# Patient Record
Sex: MALE | Race: WHITE
[De-identification: names, ages, dates, MRNs, and addresses within clinical notes are randomized per-mention and may not be internally consistent; named-entity substitution may affect disease eponyms.]

---

## 2018-07-09 ENCOUNTER — HOSPITAL ENCOUNTER (INPATIENT)
Dept: HOSPITAL 36 - ER | Age: 58
LOS: 9 days | Discharge: NURSING, REHAB OR LONG-TERM CARE | DRG: 885 | End: 2018-07-18
Attending: PSYCHIATRY & NEUROLOGY | Admitting: PSYCHIATRY & NEUROLOGY
Payer: MEDICARE

## 2018-07-09 DIAGNOSIS — K59.00: ICD-10-CM

## 2018-07-09 DIAGNOSIS — D64.9: ICD-10-CM

## 2018-07-09 DIAGNOSIS — F29: ICD-10-CM

## 2018-07-09 DIAGNOSIS — F25.9: Primary | ICD-10-CM

## 2018-07-09 DIAGNOSIS — E87.1: ICD-10-CM

## 2018-07-09 NOTE — ED PHYSICIAN CHART
ED Chief Complaint/HPI





- Patient Information


Date Seen:: 07/09/18


Time Seen:: 22:00


Chief Complaint:: psychosis


History of Present Illness:: 





58 yr old male from nursing home for geropsych evualuation


Allergies:: 


 Allergies











Allergy/AdvReac Type Severity Reaction Status Date / Time


 


No Known Allergies Allergy   Verified 07/09/18 23:15











Vitals:: 


 Vital Signs - 8 hr











  07/09/18





  23:15


 


Temp 98.1 F


 


HR 58


 


RR 18


 


/60


 


O2 Sat % 98











Historian:: Medical Records





ED Review of Systems





- Review of Systems


General/Constitutional: No fever, No chills, No weight loss, No weakness, No 

diaphoresis, No edema, No loss of appetite


Skin: No skin lesions, No rash, No bruising


Head: No headache, No light-headedness


Eyes: No loss of vision, No pain, No diplopia


ENT: No earache, No nasal drainage, No sore throat, No tinnitus


Neck: No neck pain, No swelling, No thyromegaly, No stiffness, No mass noted


Cardio Vascular: No chest pain, No palpitations, No PND, No orthopnea, No edema


Pulmonary: No SOB, No cough, No sputum, No wheezing


GI: No nausea, No vomiting, No diarrhea, No pain, No melena, No hematochezia, 

No constipation, No hematemesis


G/U: No dysuria, No frequency, No hematuria


Musculoskeletal: No bone or joint pain, No back pain, No muscle pain


Endocrine: No polyuria, No polydipsia


Psychiatric: No prior psych history, No depression, No anxiety, No suicidal 

ideation


Hematopoietic: No bruising, No lymphadenopathy


Allergic/Immuno: No urticaria, No angioedema


Neurological: No syncope, No focal symptoms, No weakness, No paresthesia, No 

headache, No seizure, No dizziness, No confusion, No vertigo





ED Past Medical History





- Past Medical History


Past Medical History: Other (psych disorder)





Family Medical History





- Family Member


  ** Mother


History Unknown: Yes





ED Physical Exam





- Physical Examination


General/Constitutional: Awake, Well-developed, well-nourished, Alert, No 

distress, GCS 15, Non-toxic appearing, Ambulatory


Head: Atraumatic


Eyes: Lids, conjuctiva normal, PERRL, EOMI


Skin: Nl inspection, No rash, No skin lesions, No ecchymosis, Well hydrated, No 

lymphadenopathy


ENMT: External ears, nose nl, Nasal exam nl, Lips, teeth, gums nl


Neck: Nontender, Full ROM w/o pain, No JVD, No nuchal rigidity, No bruit, No 

mass, No stridor


Respiratory: Nl effort/Exclusion, Clear to Auscultation, No Wheeze/Rhonchi/Rales


Cardio Vascular: RRR, No murmur, gallop, rubs, NL S1 S2


GI: No tenderness/rebounding/guarding, No organomegaly, No hernia, Normal BS's, 

Nondistended, No mass/bruits, No McBurney tenderness


: No CVA tenderness


Extremities: No tenderness or effusion, Full ROM, normal strength in all 

extremities, No edema, Normal digits & nails


Neuro/Psych: Alert/oriented, DTR's symmetric, Normal sensory exam, Normal motor 

strength, Judgement/insight normal, Mood normal, Normal gait, No focal deficits


Misc: Normal back, No paraspinal tenderness





ED Assessment





- Assessment


General Assessment: 





psychosis for eval





ED Septic Shock





- .


Is Septic Shock (SBP<90, OR Lactate>4 mmol\L) present?: No





- <6hrs of presentation:


Vital Signs: 


 Vital Signs - 8 hr











  07/09/18





  23:15


 


Temp 98.1 F


 


HR 58


 


RR 18


 


/60


 


O2 Sat % 98














ED Reassessment (Disposition)





- Reassessment


Reassessment Condition:: Unchanged





- Diagnosis


Diagnosis:: 





psychosis





- Patient Disposition


Discharge/Transfer:: Acute Care w/in this hosp

## 2018-07-10 VITALS — SYSTOLIC BLOOD PRESSURE: 95 MMHG | DIASTOLIC BLOOD PRESSURE: 51 MMHG

## 2018-07-10 LAB
ALBUMIN SERPL-MCNC: 3.8 GM/DL (ref 4.2–5.5)
ALBUMIN/GLOB SERPL: 1.6 {RATIO} (ref 1–1.8)
ALP SERPL-CCNC: 72 U/L (ref 34–104)
ALT SERPL-CCNC: 11 U/L (ref 7–52)
ANION GAP SERPL CALC-SCNC: 8.9 MMOL/L (ref 7–16)
APPEARANCE UR: CLEAR
AST SERPL-CCNC: 14 U/L (ref 13–39)
BACTERIA #/AREA URNS HPF: (no result) /HPF
BASOPHILS # BLD AUTO: 0.1 TH/CUMM (ref 0–0.2)
BASOPHILS NFR BLD AUTO: 1 % (ref 0–2)
BILIRUB SERPL-MCNC: 0.2 MG/DL (ref 0.3–1)
BILIRUB UR-MCNC: NEGATIVE MG/DL
BUN SERPL-MCNC: 11 MG/DL (ref 7–25)
CALCIUM SERPL-MCNC: 9.2 MG/DL (ref 8.6–10.3)
CHLORIDE SERPL-SCNC: 100 MEQ/L (ref 98–107)
CO2 SERPL-SCNC: 26.6 MEQ/L (ref 21–31)
COLOR UR: (no result)
CREAT SERPL-MCNC: 0.8 MG/DL (ref 0.7–1.3)
EOSINOPHIL # BLD AUTO: 0.1 TH/CMM (ref 0.1–0.4)
EOSINOPHIL NFR BLD AUTO: 1.9 % (ref 0–5)
EPI CELLS URNS QL MICRO: (no result) /LPF
ERYTHROCYTE [DISTWIDTH] IN BLOOD BY AUTOMATED COUNT: 14.7 % (ref 11.5–20)
GLOBULIN SER-MCNC: 2.4 GM/DL
GLUCOSE SERPL-MCNC: 94 MG/DL (ref 70–105)
GLUCOSE UR STRIP-MCNC: NEGATIVE MG/DL
HCT VFR BLD CALC: 34 % (ref 41–60)
HGB BLD-MCNC: 11.4 GM/DL (ref 12–16)
KETONES UR STRIP-MCNC: NEGATIVE MG/DL
LEUKOCYTE ESTERASE UR-ACNC: NEGATIVE
LYMPHOCYTE AB SER FC-ACNC: 2.8 TH/CMM (ref 1.5–3)
LYMPHOCYTES NFR BLD AUTO: 42.9 % (ref 20–50)
MCH RBC QN AUTO: 29.9 PG (ref 26–30)
MCHC RBC AUTO-ENTMCNC: 33.5 PG (ref 28–36)
MCV RBC AUTO: 89.4 FL (ref 80–99)
MICRO URNS: YES
MONOCYTES # BLD AUTO: 0.7 TH/CMM (ref 0.3–1)
MONOCYTES NFR BLD AUTO: 10.8 % (ref 2–10)
NEUTROPHILS # BLD: 2.8 TH/CMM (ref 1.8–8)
NEUTROPHILS NFR BLD AUTO: 43.4 % (ref 40–80)
NITRITE UR QL STRIP: NEGATIVE
PH UR STRIP: 7 [PH] (ref 4.6–8)
PLATELET # BLD: 371 TH/CMM (ref 150–400)
PMV BLD AUTO: 6 FL
POTASSIUM SERPL-SCNC: 3.5 MEQ/L (ref 3.5–5.1)
PROT UR STRIP-MCNC: NEGATIVE MG/DL
RBC # BLD AUTO: 3.8 MIL/CMM (ref 4.3–5.7)
RBC # UR STRIP: (no result) /UL
RBC #/AREA URNS HPF: (no result) /HPF (ref 0–5)
SODIUM SERPL-SCNC: 132 MEQ/L (ref 136–145)
SP GR UR STRIP: <= 1.005 (ref 1–1.03)
URINALYSIS COMPLETE PNL UR: (no result)
UROBILINOGEN UR STRIP-ACNC: 0.2 E.U./DL (ref 0.2–1)
WBC # BLD AUTO: 6.5 TH/CMM (ref 4.8–10.8)
WBC #/AREA URNS HPF: (no result) /HPF (ref 0–5)

## 2018-07-10 NOTE — HISTORY & PHYSICAL
ADMIT DATE:  07/10/2018



HISTORY OF PRESENT ILLNESS:  The patient is a 58-year-old male with long history

of psychosis, admitted to [facility] under Dr. Greer's service for evaluation

and treatment.  The patient denies any chest pain, shortness of breath, nausea,

vomiting, fever or chills.



PAST MEDICAL HISTORY:  Significant for psychosis.



PAST SURGICAL HISTORY:  No recent surgery.



ALLERGIES:  None.



MEDICATIONS:  Follow admission reconciliation.



SOCIAL HISTORY:  No smoking, alcohol or drugs.



FAMILY HISTORY:  Noncontributory.



REVIEW OF SYSTEMS:

RENAL SYSTEM:  No history of chronic renal disorder.

CARDIOVASCULAR SYSTEM:  No coronary artery disease.

ENDOCRINE SYSTEM:  No diabetes or thyroid problem.

GASTROINTESTINAL SYSTEM:  No upper or lower gastrointestinal bleed.

NEUROLOGICAL SYSTEM:  No seizure disorder.

MUSCULOSKELETAL SYSTEM:  No muscular dystrophy.

HEMATOLOGICAL SYSTEM:  No bleeding tendency.

RESPIRATORY SYSTEM:  No asthma.

GENITOURINARY:  No hematuria.



PHYSICAL EXAMINATION:

GENERAL:  He is awake, alert, mildly confused.

VITAL SIGNS:  Temperature 98.1, heart rate 67, blood pressure 129/73.

HEENT:  Normocephalic.  Pupils reacting equal to light and accommodation. 

Sclerae clear.

NECK:  Supple.  Negative for lymphadenopathy, JVD or bruit.

CHEST:  Bilaterally normal.  No rhonchi or wheezing.

HEART:  S1, S2 normal.  No murmur or gallop rhythm.

ABDOMEN:  Soft, bowel sounds positive.

EXTREMITIES:  No edema.

NEUROLOGIC:  He is awake, alert, mildly confused.



LABORATORY DATA:  White blood cells 6.5, hemoglobin 11.4, hematocrit 34.0,

platelet 371.  Sodium 132, potassium 3.5, BUN 11, creatinine 0.8.



ASSESSMENT:

1.  Anemia.

2.  Hyponatremia.

3.  Constipation.

4.  Psychosis.



PLAN:  The patient admitted to the hospital under Dr. Greer's service.  Medical

problem to address during hospitalization is psychosis.  Medical problem to

address at discharge is mild anemia.  The patient is medically stable for

activity.



Thank you Dr. Greer for asking me to see your patient.





DD: 07/10/2018 21:42

DT: 07/10/2018 23:38

JOB# [id number]  [id number]

## 2018-07-11 NOTE — PSYCHOSOCIAL EVALUATION
DATE OF SERVICE:  07/10/2018



JUSTIFICATION FOR HOSPITALIZATION:  The patient is currently in the hospital,

states that he has been having thoughts of "passing away."



CHIEF COMPLAINT:  "I have been having thoughts of passing away."



HISTORY OF PRESENT ILLNESS:  A _____[id number] male attesting to depression,

thoughts of "passing away," apparently coming in from [place], more

agitation and hallucinating, talking to himself, not making any sense.  On exam,

he is pretty disoriented, does not know why he is here other than the

above-mentioned.  Okay sleep.  Okay appetite.  Later during the interview

telling me "I am not depressed, I feel fine, I want to go home."  He is pretty

confused on exam, poor historian.



PAST PSYCHIATRIC ADMISSION:  He states he has been to [facility] hospitals before.



FAMILY HISTORY:  Denies.



SOCIAL HISTORY:  States he was born in [place], not , no kids.  Denies any

drugs.  No alcohol or tobacco.  States he lives in an apartment alone, but in

fact lives at [facility].



MEDICATIONS:  Noted.



MEDICAL HISTORY:  Please see full H and P.



MENTAL STATUS EXAMINATION:  Stated age.  Fair eye contact.  Rambling speech. 

Mildly pressured speech.  Psychomotorically accelerated.  Restless.  Mood

"fine."  Affect flat.  Thought processes are disorganized.  No overt SI or HI

at this time, but he is alluding to thoughts of "passing away."  The patient

appearing psychotic, mumbling to self.  Insight and judgment diminished.  He

does not know where he is or what is going on.



PROVISIONAL DIAGNOSIS:  Likely schizophrenia.  Medical:  Please see full H and

P.



ESTIMATED LENGTH OF STAY:  5-7 days.



ASSESSMENT:  The patient requiring hospitalization, bizarre behaviors,

aggressive behaviors, psychotic behaviors.



PLAN:  Continue fluphenazine with plans to titrate.



TREATMENT PLAN:  Includes group as well as milieu therapy.



CONDITIONS FOR DISCHARGE:  Improved mood, improved affect, cessation of any SI,

better coping, better control of his psychotic symptoms.





DD: 07/10/2018 13:30

DT: 07/11/2018 12:19

JOB# [id number]  [id number]

## 2018-07-11 NOTE — INTERNAL MEDICINE PROG NOTE
Internal Medicine Subjective





- Subjective


Service Date: 07/11/18


Patient seen and examined:: with staff


Patient is:: awake, verbal, confused


Per staff patient has:: no adverse event





Internal Medicine Objective





- Results


Result Diagrams: 


 07/09/18 23:55





 07/09/18 23:55


Recent Labs: 


 Laboratory Last Values











WBC  6.5 Th/cmm (4.8-10.8)   07/09/18  23:55    


 


RBC  3.80 Mil/cmm (4.30-5.70)  L  07/09/18  23:55    


 


Hgb  11.4 gm/dL (12-16)  L  07/09/18  23:55    


 


Hct  34.0 % (41.0-60)  L  07/09/18  23:55    


 


MCV  89.4 fl (80-99)   07/09/18  23:55    


 


MCH  29.9 pg (26.0-30.0)   07/09/18  23:55    


 


MCHC Differential  33.5 pg (28.0-36.0)   07/09/18  23:55    


 


RDW  14.7 % (11.5-20.0)   07/09/18  23:55    


 


Plt Count  371 Th/cmm (150-400)   07/09/18  23:55    


 


MPV  6.0 fl  07/09/18  23:55    


 


Neutrophils %  43.4 % (40.0-80.0)   07/09/18  23:55    


 


Lymphocytes %  42.9 % (20.0-50.0)   07/09/18  23:55    


 


Monocytes %  10.8 % (2.0-10.0)  H  07/09/18  23:55    


 


Eosinophils %  1.9 % (0.0-5.0)   07/09/18  23:55    


 


Basophils %  1.0 % (0.0-2.0)   07/09/18  23:55    


 


Sodium  132 mEq/L (136-145)  L  07/09/18  23:55    


 


Potassium  3.5 mEq/L (3.5-5.1)   07/09/18  23:55    


 


Chloride  100 mEq/L ()   07/09/18  23:55    


 


Carbon Dioxide  26.6 mEq/L (21.0-31.0)   07/09/18  23:55    


 


Anion Gap  8.9  (7.0-16.0)   07/09/18  23:55    


 


BUN  11 mg/dL (7-25)   07/09/18  23:55    


 


Creatinine  0.8 mg/dL (0.7-1.3)   07/09/18  23:55    


 


Est GFR ( Amer)  > 60.0 ml/min (>90)   07/09/18  23:55    


 


Est GFR (Non-Af Amer)  > 60.0 ml/min  07/09/18  23:55    


 


BUN/Creatinine Ratio  13.8   07/09/18  23:55    


 


Glucose  94 mg/dL ()   07/09/18  23:55    


 


Calcium  9.2 mg/dL (8.6-10.3)   07/09/18  23:55    


 


Total Bilirubin  0.2 mg/dL (0.3-1.0)  L  07/09/18  23:55    


 


AST  14 U/L (13-39)   07/09/18  23:55    


 


ALT  11 U/L (7-52)   07/09/18  23:55    


 


Alkaline Phosphatase  72 U/L ()   07/09/18  23:55    


 


Total Protein  6.2 gm/dL (6.0-8.3)   07/09/18  23:55    


 


Albumin  3.8 gm/dL (4.2-5.5)  L  07/09/18  23:55    


 


Globulin  2.4 gm/dL  07/09/18  23:55    


 


Albumin/Globulin Ratio  1.6  (1.0-1.8)   07/09/18  23:55    


 


Urine Source  CLEAN C   07/09/18  23:45    


 


Urine Color  OTHER   07/09/18  23:45    


 


Urine Clarity  CLEAR  (CLEAR)   07/09/18  23:45    


 


Urine pH  7.0  (4.6 - 8.0)   07/09/18  23:45    


 


Ur Specific Gravity  <= 1.005  (1.005-1.030)   07/09/18  23:45    


 


Urine Protein  NEGATIVE mg/dL (NEGATIVE)   07/09/18  23:45    


 


Urine Glucose (UA)  NEGATIVE mg/dL (NEGATIVE)   07/09/18  23:45    


 


Urine Ketones  NEGATIVE mg/dL (NEGATIVE)   07/09/18  23:45    


 


Urine Blood  TRACE  (NEGATIVE)   07/09/18  23:45    


 


Urine Nitrate  NEGATIVE  (NEGATIVE)   07/09/18  23:45    


 


Urine Bilirubin  NEGATIVE  (NEGATIVE)   07/09/18  23:45    


 


Urine Urobilinogen  0.2 E.U./dL (0.2 - 1.0)   07/09/18  23:45    


 


Ur Leukocyte Esterase  NEGATIVE  (NEGATIVE)   07/09/18  23:45    


 


Urine RBC  0-2 /hpf (0-5)  H  07/09/18  23:45    


 


Urine WBC  0-2 /hpf (0-5)   07/09/18  23:45    


 


Ur Epithelial Cells  OCCASIONAL /lpf (FEW)   07/09/18  23:45    


 


Urine Bacteria  OCCASIONAL /hpf (NONE SEEN)   07/09/18  23:45    














- Physical Exam


Vitals and I&O: 


 Vital Signs











Temp  98.6 F   07/11/18 15:45


 


Pulse  84   07/11/18 15:45


 


Resp  17   07/11/18 15:45


 


BP  126/68   07/11/18 15:45


 


Pulse Ox  98   07/11/18 15:45








 Intake & Output











 07/11/18 07/11/18 07/12/18





 06:59 18:59 06:59


 


Intake Total 120  


 


Balance 120  


 


Intake:   


 


  Oral 120  


 


Other:   


 


  # Voids 3  


 


  # Bowel Movements 0  











Active Medications: 


Current Medications





Acetaminophen (Tylenol)  650 mg PO Q4HR PRN


   PRN Reason: Mild Pain / Temp above 100


   Stop: 09/08/18 02:13


Al Hydrox/Mg Hydrox/Simethicone (Maalox)  30 ml PO Q4HR PRN


   PRN Reason: GI DISTRESS


   Stop: 09/08/18 02:13


Fluphenazine HCl (Prolixin)  5 mg PO Q12HR [facility]


   Stop: 09/08/18 08:59


   Last Admin: 07/11/18 08:41 Dose:  5 mg


Galantamine Hydrobromide (Razadyne)  8 mg PO Q12HR [facility]


   Stop: 09/08/18 08:59


   Last Admin: 07/11/18 08:41 Dose:  8 mg


Lorazepam (Ativan)  0.5 mg PO Q4HR PRN; Protocol


   PRN Reason: Anxiety/agitation


   Stop: 08/09/18 02:13


Magnesium Hydroxide (Milk Of Magnesia)  30 ml PO Q8HR PRN


   PRN Reason: Constipation


   Stop: 09/08/18 21:37


Multivitamins/Vitamin C (Theragran)  1 tab PO DAILY [facility]


   Stop: 09/08/18 08:59


   Last Admin: 07/11/18 08:41 Dose:  1 tab


Sodium Chloride (Nacl Tab)  1 gm PO BID [facility]


   Stop: 09/08/18 08:59


   Last Admin: 07/11/18 16:07 Dose:  1 gm


Zolpidem Tartrate (Ambien)  5 mg PO HS PRN


   PRN Reason: Insomnia


   Stop: 09/08/18 02:13


   Last Admin: 07/10/18 20:19 Dose:  5 mg








General: alert


HEENT: NC/AT, PERRLA, EOMI, anicteric sclerae, throat clear


Neck: Supple, No JVD, No thyromegaly, +2 carotid pulse wo bruit


Cardiovascular: RRR, Normal S1, Normal S2, without murmur


Abdomen: soft, non-tender, non-distended


Extremities: clear


Neurological: no change





Internal Medicine Assmt/Plan





- Assessment


Assessment: 





1.ANEMIA.


2.HYPONATREMIA.


3.CONSTIPATION.


4.PSYCHOSIS





- Plan


Plan: 





CONTINUE ON CURRERNT MEDICATION AND DIET.

## 2018-07-12 NOTE — PROGRESS NOTES
DATE:  07/11/2018



SUBJECTIVE:  Chart reviewed and the patient interviewed.  Also discussed the

patient's condition with the staff and reviewed records and labs.  The patient

is easily agitated and restless.  The patient is pacing up and down the unit in

angry and in irritable mood and have difficulty following directions.  The

patient also is rambling.  The patient is interrupting me during my interview

and during asking questions and kept going back and forth and restless.



ASSESSMENT:  The patient is still psychotic and agitated.



TREATMENT PLAN:  Continue to monitor his behavior and his condition closely. 

Also, continue to work on his irritability and his agitation and continue to

follow up.





DD: 07/11/2018 20:31

DT: 07/12/2018 17:40

JOB# [id number]  [id number]

## 2018-07-12 NOTE — INTERNAL MEDICINE PROG NOTE
Internal Medicine Subjective





- Subjective


Service Date: 07/12/18


Patient seen and examined:: with staff


Patient is:: awake, verbal, confused


Per staff patient has:: no adverse event





Internal Medicine Objective





- Results


Result Diagrams: 


 07/09/18 23:55





 07/09/18 23:55


Recent Labs: 


 Laboratory Last Values











WBC  6.5 Th/cmm (4.8-10.8)   07/09/18  23:55    


 


RBC  3.80 Mil/cmm (4.30-5.70)  L  07/09/18  23:55    


 


Hgb  11.4 gm/dL (12-16)  L  07/09/18  23:55    


 


Hct  34.0 % (41.0-60)  L  07/09/18  23:55    


 


MCV  89.4 fl (80-99)   07/09/18  23:55    


 


MCH  29.9 pg (26.0-30.0)   07/09/18  23:55    


 


MCHC Differential  33.5 pg (28.0-36.0)   07/09/18  23:55    


 


RDW  14.7 % (11.5-20.0)   07/09/18  23:55    


 


Plt Count  371 Th/cmm (150-400)   07/09/18  23:55    


 


MPV  6.0 fl  07/09/18  23:55    


 


Neutrophils %  43.4 % (40.0-80.0)   07/09/18  23:55    


 


Lymphocytes %  42.9 % (20.0-50.0)   07/09/18  23:55    


 


Monocytes %  10.8 % (2.0-10.0)  H  07/09/18  23:55    


 


Eosinophils %  1.9 % (0.0-5.0)   07/09/18  23:55    


 


Basophils %  1.0 % (0.0-2.0)   07/09/18  23:55    


 


Sodium  132 mEq/L (136-145)  L  07/09/18  23:55    


 


Potassium  3.5 mEq/L (3.5-5.1)   07/09/18  23:55    


 


Chloride  100 mEq/L ()   07/09/18  23:55    


 


Carbon Dioxide  26.6 mEq/L (21.0-31.0)   07/09/18  23:55    


 


Anion Gap  8.9  (7.0-16.0)   07/09/18  23:55    


 


BUN  11 mg/dL (7-25)   07/09/18  23:55    


 


Creatinine  0.8 mg/dL (0.7-1.3)   07/09/18  23:55    


 


Est GFR ( Amer)  > 60.0 ml/min (>90)   07/09/18  23:55    


 


Est GFR (Non-Af Amer)  > 60.0 ml/min  07/09/18  23:55    


 


BUN/Creatinine Ratio  13.8   07/09/18  23:55    


 


Glucose  94 mg/dL ()   07/09/18  23:55    


 


Calcium  9.2 mg/dL (8.6-10.3)   07/09/18  23:55    


 


Total Bilirubin  0.2 mg/dL (0.3-1.0)  L  07/09/18  23:55    


 


AST  14 U/L (13-39)   07/09/18  23:55    


 


ALT  11 U/L (7-52)   07/09/18  23:55    


 


Alkaline Phosphatase  72 U/L ()   07/09/18  23:55    


 


Total Protein  6.2 gm/dL (6.0-8.3)   07/09/18  23:55    


 


Albumin  3.8 gm/dL (4.2-5.5)  L  07/09/18  23:55    


 


Globulin  2.4 gm/dL  07/09/18  23:55    


 


Albumin/Globulin Ratio  1.6  (1.0-1.8)   07/09/18  23:55    


 


Urine Source  CLEAN C   07/09/18  23:45    


 


Urine Color  OTHER   07/09/18  23:45    


 


Urine Clarity  CLEAR  (CLEAR)   07/09/18  23:45    


 


Urine pH  7.0  (4.6 - 8.0)   07/09/18  23:45    


 


Ur Specific Gravity  <= 1.005  (1.005-1.030)   07/09/18  23:45    


 


Urine Protein  NEGATIVE mg/dL (NEGATIVE)   07/09/18  23:45    


 


Urine Glucose (UA)  NEGATIVE mg/dL (NEGATIVE)   07/09/18  23:45    


 


Urine Ketones  NEGATIVE mg/dL (NEGATIVE)   07/09/18  23:45    


 


Urine Blood  TRACE  (NEGATIVE)   07/09/18  23:45    


 


Urine Nitrate  NEGATIVE  (NEGATIVE)   07/09/18  23:45    


 


Urine Bilirubin  NEGATIVE  (NEGATIVE)   07/09/18  23:45    


 


Urine Urobilinogen  0.2 E.U./dL (0.2 - 1.0)   07/09/18  23:45    


 


Ur Leukocyte Esterase  NEGATIVE  (NEGATIVE)   07/09/18  23:45    


 


Urine RBC  0-2 /hpf (0-5)  H  07/09/18  23:45    


 


Urine WBC  0-2 /hpf (0-5)   07/09/18  23:45    


 


Ur Epithelial Cells  OCCASIONAL /lpf (FEW)   07/09/18  23:45    


 


Urine Bacteria  OCCASIONAL /hpf (NONE SEEN)   07/09/18  23:45    














- Physical Exam


Vitals and I&O: 


 Vital Signs











Temp  96.9 F   07/12/18 21:34


 


Pulse  71   07/12/18 21:34


 


Resp  20   07/12/18 21:34


 


BP  100/61   07/12/18 21:34


 


Pulse Ox  99   07/12/18 21:34








 Intake & Output











 07/12/18 07/12/18 07/13/18





 06:59 18:59 06:59


 


Intake Total 500  500


 


Balance 500  500


 


Intake:   


 


  Oral 500  500


 


Other:   


 


  # Voids 3  3


 


  # Bowel Movements 0  0











Active Medications: 


Current Medications





Acetaminophen (Tylenol)  650 mg PO Q4HR PRN


   PRN Reason: Mild Pain / Temp above 100


   Stop: 09/08/18 02:13


Al Hydrox/Mg Hydrox/Simethicone (Maalox)  30 ml PO Q4HR PRN


   PRN Reason: GI DISTRESS


   Stop: 09/08/18 02:13


Fluphenazine HCl (Prolixin)  5 mg PO Q12HR [facility]


   Stop: 09/08/18 08:59


   Last Admin: 07/12/18 20:54 Dose:  5 mg


Galantamine Hydrobromide (Razadyne)  8 mg PO Q12HR [facility]


   Stop: 09/08/18 08:59


   Last Admin: 07/12/18 20:05 Dose:  8 mg


Lorazepam (Ativan)  0.5 mg PO Q4HR PRN; Protocol


   PRN Reason: Anxiety/agitation


   Stop: 08/09/18 02:13


Magnesium Hydroxide (Milk Of Magnesia)  30 ml PO Q8HR PRN


   PRN Reason: Constipation


   Stop: 09/08/18 21:37


Multivitamins/Vitamin C (Theragran)  1 tab PO DAILY [facility]


   Stop: 09/08/18 08:59


   Last Admin: 07/12/18 09:26 Dose:  1 tab


Sodium Chloride (Nacl Tab)  1 gm PO BID [facility]


   Stop: 09/08/18 08:59


   Last Admin: 07/12/18 16:55 Dose:  1 gm


Zolpidem Tartrate (Ambien)  5 mg PO HS PRN


   PRN Reason: Insomnia


   Stop: 09/08/18 02:13


   Last Admin: 07/12/18 20:54 Dose:  5 mg








General: alert


HEENT: NC/AT, PERRLA, EOMI, anicteric sclerae, throat clear


Neck: Supple, No JVD, No thyromegaly, +2 carotid pulse wo bruit


Cardiovascular: RRR, Normal S1, Normal S2, without murmur


Abdomen: soft, non-tender, non-distended


Extremities: clear


Neurological: no change





Internal Medicine Assmt/Plan





- Assessment


Assessment: 





1.ANEMIA.


2.HYPONATREMIA.


3.CONSTIPATION.


4.PSYCHOSIS





- Plan


Plan: 





CONTINUE ON CURRERNT MEDICATION AND DIET.

## 2018-07-13 NOTE — PROGRESS NOTES
DATE:  07/12/2018



SUBJECTIVE:  Chart reviewed and the patient interviewed.  Also, discussed the

patient's condition with the staff and reviewed records and labs.  The patient

is still isolative and withdrawn.  The patient also is still guarded.  Also,

still wants to be left alone.  The patient is compliant with taking his

medications with no side effects of medications.



ASSESSMENT:  The patient is still depressed and is still withdrawn.



TREATMENT PLAN:  Continue to monitor his behavior and his condition closely. 

Also, continue to work on his ineffective coping and continue to follow up.





DD: 07/12/2018 20:17

DT: 07/13/2018 01:41

JOB# [id number]  [id number]

## 2018-07-13 NOTE — INTERNAL MEDICINE PROG NOTE
Internal Medicine Subjective





- Subjective


Service Date: 07/13/18


Patient seen and examined:: with staff


Patient is:: awake, verbal, confused


Per staff patient has:: no adverse event





Internal Medicine Objective





- Results


Result Diagrams: 


 07/09/18 23:55





 07/09/18 23:55


Recent Labs: 


 Laboratory Last Values











WBC  6.5 Th/cmm (4.8-10.8)   07/09/18  23:55    


 


RBC  3.80 Mil/cmm (4.30-5.70)  L  07/09/18  23:55    


 


Hgb  11.4 gm/dL (12-16)  L  07/09/18  23:55    


 


Hct  34.0 % (41.0-60)  L  07/09/18  23:55    


 


MCV  89.4 fl (80-99)   07/09/18  23:55    


 


MCH  29.9 pg (26.0-30.0)   07/09/18  23:55    


 


MCHC Differential  33.5 pg (28.0-36.0)   07/09/18  23:55    


 


RDW  14.7 % (11.5-20.0)   07/09/18  23:55    


 


Plt Count  371 Th/cmm (150-400)   07/09/18  23:55    


 


MPV  6.0 fl  07/09/18  23:55    


 


Neutrophils %  43.4 % (40.0-80.0)   07/09/18  23:55    


 


Lymphocytes %  42.9 % (20.0-50.0)   07/09/18  23:55    


 


Monocytes %  10.8 % (2.0-10.0)  H  07/09/18  23:55    


 


Eosinophils %  1.9 % (0.0-5.0)   07/09/18  23:55    


 


Basophils %  1.0 % (0.0-2.0)   07/09/18  23:55    


 


Sodium  132 mEq/L (136-145)  L  07/09/18  23:55    


 


Potassium  3.5 mEq/L (3.5-5.1)   07/09/18  23:55    


 


Chloride  100 mEq/L ()   07/09/18  23:55    


 


Carbon Dioxide  26.6 mEq/L (21.0-31.0)   07/09/18  23:55    


 


Anion Gap  8.9  (7.0-16.0)   07/09/18  23:55    


 


BUN  11 mg/dL (7-25)   07/09/18  23:55    


 


Creatinine  0.8 mg/dL (0.7-1.3)   07/09/18  23:55    


 


Est GFR ( Amer)  > 60.0 ml/min (>90)   07/09/18  23:55    


 


Est GFR (Non-Af Amer)  > 60.0 ml/min  07/09/18  23:55    


 


BUN/Creatinine Ratio  13.8   07/09/18  23:55    


 


Glucose  94 mg/dL ()   07/09/18  23:55    


 


Calcium  9.2 mg/dL (8.6-10.3)   07/09/18  23:55    


 


Total Bilirubin  0.2 mg/dL (0.3-1.0)  L  07/09/18  23:55    


 


AST  14 U/L (13-39)   07/09/18  23:55    


 


ALT  11 U/L (7-52)   07/09/18  23:55    


 


Alkaline Phosphatase  72 U/L ()   07/09/18  23:55    


 


Total Protein  6.2 gm/dL (6.0-8.3)   07/09/18  23:55    


 


Albumin  3.8 gm/dL (4.2-5.5)  L  07/09/18  23:55    


 


Globulin  2.4 gm/dL  07/09/18  23:55    


 


Albumin/Globulin Ratio  1.6  (1.0-1.8)   07/09/18  23:55    


 


Urine Source  CLEAN C   07/09/18  23:45    


 


Urine Color  OTHER   07/09/18  23:45    


 


Urine Clarity  CLEAR  (CLEAR)   07/09/18  23:45    


 


Urine pH  7.0  (4.6 - 8.0)   07/09/18  23:45    


 


Ur Specific Gravity  <= 1.005  (1.005-1.030)   07/09/18  23:45    


 


Urine Protein  NEGATIVE mg/dL (NEGATIVE)   07/09/18  23:45    


 


Urine Glucose (UA)  NEGATIVE mg/dL (NEGATIVE)   07/09/18  23:45    


 


Urine Ketones  NEGATIVE mg/dL (NEGATIVE)   07/09/18  23:45    


 


Urine Blood  TRACE  (NEGATIVE)   07/09/18  23:45    


 


Urine Nitrate  NEGATIVE  (NEGATIVE)   07/09/18  23:45    


 


Urine Bilirubin  NEGATIVE  (NEGATIVE)   07/09/18  23:45    


 


Urine Urobilinogen  0.2 E.U./dL (0.2 - 1.0)   07/09/18  23:45    


 


Ur Leukocyte Esterase  NEGATIVE  (NEGATIVE)   07/09/18  23:45    


 


Urine RBC  0-2 /hpf (0-5)  H  07/09/18  23:45    


 


Urine WBC  0-2 /hpf (0-5)   07/09/18  23:45    


 


Ur Epithelial Cells  OCCASIONAL /lpf (FEW)   07/09/18  23:45    


 


Urine Bacteria  OCCASIONAL /hpf (NONE SEEN)   07/09/18  23:45    














- Physical Exam


Vitals and I&O: 


 Vital Signs











Temp  97.6 F   07/13/18 14:00


 


Pulse  77   07/13/18 14:00


 


Resp  20   07/13/18 14:00


 


BP  103/60   07/13/18 14:00


 


Pulse Ox  100   07/13/18 14:00








 Intake & Output











 07/13/18 07/13/18 07/14/18





 06:59 18:59 06:59


 


Intake Total 500  


 


Balance 500  


 


Intake:   


 


  Oral 500  


 


Other:   


 


  # Voids 3  


 


  # Bowel Movements 0  











Active Medications: 


Current Medications





Acetaminophen (Tylenol)  650 mg PO Q4HR PRN


   PRN Reason: Mild Pain / Temp above 100


   Stop: 09/08/18 02:13


Al Hydrox/Mg Hydrox/Simethicone (Maalox)  30 ml PO Q4HR PRN


   PRN Reason: GI DISTRESS


   Stop: 09/08/18 02:13


Fluphenazine HCl (Prolixin)  5 mg PO Q12HR [facility]


   Stop: 09/08/18 08:59


   Last Admin: 07/13/18 08:44 Dose:  5 mg


Galantamine Hydrobromide (Razadyne)  8 mg PO Q12HR [facility]


   Stop: 09/08/18 08:59


   Last Admin: 07/13/18 08:44 Dose:  8 mg


Lorazepam (Ativan)  0.5 mg PO Q4HR PRN; Protocol


   PRN Reason: Anxiety/agitation


   Stop: 08/09/18 02:13


Magnesium Hydroxide (Milk Of Magnesia)  30 ml PO Q8HR PRN


   PRN Reason: Constipation


   Stop: 09/08/18 21:37


Multivitamins/Vitamin C (Theragran)  1 tab PO DAILY [facility]


   Stop: 09/08/18 08:59


   Last Admin: 07/13/18 08:44 Dose:  1 tab


Sodium Chloride (Nacl Tab)  1 gm PO BID [facility]


   Stop: 09/08/18 08:59


   Last Admin: 07/13/18 17:14 Dose:  1 gm


Zolpidem Tartrate (Ambien)  5 mg PO HS PRN


   PRN Reason: Insomnia


   Stop: 09/08/18 02:13


   Last Admin: 07/12/18 20:54 Dose:  5 mg








General: alert


HEENT: NC/AT, PERRLA, EOMI, anicteric sclerae, throat clear


Neck: Supple, No JVD, No thyromegaly, +2 carotid pulse wo bruit


Cardiovascular: RRR, Normal S1, Normal S2, without murmur


Abdomen: soft, non-tender, non-distended


Extremities: clear


Neurological: no change





Internal Medicine Assmt/Plan





- Assessment


Assessment: 





1.ANEMIA.


2.HYPONATREMIA.


3.CONSTIPATION.


4.PSYCHOSIS





- Plan


Plan: 





CONTINUE ON CURRERNT MEDICATION AND DIET.

## 2018-07-14 NOTE — PROGRESS NOTES
DATE:  



SUBJECTIVE:  Chart reviewed and the patient interviewed.  Also discussed the

patient's condition with the staff and reviewed records and labs.  The patient

is still depressed and is still withdrawn.  The patient also is still guarded,

and he does not want to talk about himself or his feelings.  The patient also

wants to be left alone.  Otherwise, the patient is cooperative and compliant

with taking his medications with no side effect of medications.



ASSESSMENT:  The patient is still depressed.



TREATMENT PLAN:  Continue monitoring his behavior and his condition closely. 

Also, continue adjusting psychotropic medications and followup.





DD: 07/13/2018 21:20

DT: 07/14/2018 11:41

JOB# [id number]  [id number]

## 2018-07-14 NOTE — INTERNAL MEDICINE PROG NOTE
Internal Medicine Subjective





- Subjective


Service Date: 07/14/18


Patient seen and examined:: with staff


Patient is:: awake, verbal, confused


Per staff patient has:: no adverse event





Internal Medicine Objective





- Results


Result Diagrams: 


 07/09/18 23:55





 07/09/18 23:55


Recent Labs: 


 Laboratory Last Values











WBC  6.5 Th/cmm (4.8-10.8)   07/09/18  23:55    


 


RBC  3.80 Mil/cmm (4.30-5.70)  L  07/09/18  23:55    


 


Hgb  11.4 gm/dL (12-16)  L  07/09/18  23:55    


 


Hct  34.0 % (41.0-60)  L  07/09/18  23:55    


 


MCV  89.4 fl (80-99)   07/09/18  23:55    


 


MCH  29.9 pg (26.0-30.0)   07/09/18  23:55    


 


MCHC Differential  33.5 pg (28.0-36.0)   07/09/18  23:55    


 


RDW  14.7 % (11.5-20.0)   07/09/18  23:55    


 


Plt Count  371 Th/cmm (150-400)   07/09/18  23:55    


 


MPV  6.0 fl  07/09/18  23:55    


 


Neutrophils %  43.4 % (40.0-80.0)   07/09/18  23:55    


 


Lymphocytes %  42.9 % (20.0-50.0)   07/09/18  23:55    


 


Monocytes %  10.8 % (2.0-10.0)  H  07/09/18  23:55    


 


Eosinophils %  1.9 % (0.0-5.0)   07/09/18  23:55    


 


Basophils %  1.0 % (0.0-2.0)   07/09/18  23:55    


 


Sodium  132 mEq/L (136-145)  L  07/09/18  23:55    


 


Potassium  3.5 mEq/L (3.5-5.1)   07/09/18  23:55    


 


Chloride  100 mEq/L ()   07/09/18  23:55    


 


Carbon Dioxide  26.6 mEq/L (21.0-31.0)   07/09/18  23:55    


 


Anion Gap  8.9  (7.0-16.0)   07/09/18  23:55    


 


BUN  11 mg/dL (7-25)   07/09/18  23:55    


 


Creatinine  0.8 mg/dL (0.7-1.3)   07/09/18  23:55    


 


Est GFR ( Amer)  > 60.0 ml/min (>90)   07/09/18  23:55    


 


Est GFR (Non-Af Amer)  > 60.0 ml/min  07/09/18  23:55    


 


BUN/Creatinine Ratio  13.8   07/09/18  23:55    


 


Glucose  94 mg/dL ()   07/09/18  23:55    


 


Calcium  9.2 mg/dL (8.6-10.3)   07/09/18  23:55    


 


Total Bilirubin  0.2 mg/dL (0.3-1.0)  L  07/09/18  23:55    


 


AST  14 U/L (13-39)   07/09/18  23:55    


 


ALT  11 U/L (7-52)   07/09/18  23:55    


 


Alkaline Phosphatase  72 U/L ()   07/09/18  23:55    


 


Total Protein  6.2 gm/dL (6.0-8.3)   07/09/18  23:55    


 


Albumin  3.8 gm/dL (4.2-5.5)  L  07/09/18  23:55    


 


Globulin  2.4 gm/dL  07/09/18  23:55    


 


Albumin/Globulin Ratio  1.6  (1.0-1.8)   07/09/18  23:55    


 


Urine Source  CLEAN C   07/09/18  23:45    


 


Urine Color  OTHER   07/09/18  23:45    


 


Urine Clarity  CLEAR  (CLEAR)   07/09/18  23:45    


 


Urine pH  7.0  (4.6 - 8.0)   07/09/18  23:45    


 


Ur Specific Gravity  <= 1.005  (1.005-1.030)   07/09/18  23:45    


 


Urine Protein  NEGATIVE mg/dL (NEGATIVE)   07/09/18  23:45    


 


Urine Glucose (UA)  NEGATIVE mg/dL (NEGATIVE)   07/09/18  23:45    


 


Urine Ketones  NEGATIVE mg/dL (NEGATIVE)   07/09/18  23:45    


 


Urine Blood  TRACE  (NEGATIVE)   07/09/18  23:45    


 


Urine Nitrate  NEGATIVE  (NEGATIVE)   07/09/18  23:45    


 


Urine Bilirubin  NEGATIVE  (NEGATIVE)   07/09/18  23:45    


 


Urine Urobilinogen  0.2 E.U./dL (0.2 - 1.0)   07/09/18  23:45    


 


Ur Leukocyte Esterase  NEGATIVE  (NEGATIVE)   07/09/18  23:45    


 


Urine RBC  0-2 /hpf (0-5)  H  07/09/18  23:45    


 


Urine WBC  0-2 /hpf (0-5)   07/09/18  23:45    


 


Ur Epithelial Cells  OCCASIONAL /lpf (FEW)   07/09/18  23:45    


 


Urine Bacteria  OCCASIONAL /hpf (NONE SEEN)   07/09/18  23:45    














- Physical Exam


Vitals and I&O: 


 Vital Signs











Temp  98 F   07/14/18 20:25


 


Pulse  69   07/14/18 20:25


 


Resp  18   07/14/18 20:25


 


BP  121/67   07/14/18 20:25


 


Pulse Ox  95   07/14/18 20:25








 Intake & Output











 07/14/18 07/14/18 07/15/18





 06:59 18:59 06:59


 


Intake Total  1400 240


 


Balance  1400 240


 


Intake:   


 


  Oral  1400 240


 


Other:   


 


  # Voids  4 2











Active Medications: 


Current Medications





Acetaminophen (Tylenol)  650 mg PO Q4HR PRN


   PRN Reason: Mild Pain / Temp above 100


   Stop: 09/08/18 02:13


Al Hydrox/Mg Hydrox/Simethicone (Maalox)  30 ml PO Q4HR PRN


   PRN Reason: GI DISTRESS


   Stop: 09/08/18 02:13


Fluphenazine HCl (Prolixin)  5 mg PO Q12HR [facility]


   Stop: 09/08/18 08:59


   Last Admin: 07/14/18 09:39 Dose:  5 mg


Galantamine Hydrobromide (Razadyne)  8 mg PO Q12HR [facility]


   Stop: 09/08/18 08:59


   Last Admin: 07/14/18 09:39 Dose:  8 mg


Lorazepam (Ativan)  0.5 mg PO Q4HR PRN; Protocol


   PRN Reason: Anxiety/agitation


   Stop: 08/09/18 02:13


Magnesium Hydroxide (Milk Of Magnesia)  30 ml PO Q8HR PRN


   PRN Reason: Constipation


   Stop: 09/08/18 21:37


Multivitamins/Vitamin C (Theragran)  1 tab PO DAILY [facility]


   Stop: 09/08/18 08:59


   Last Admin: 07/14/18 09:39 Dose:  1 tab


Sodium Chloride (Nacl Tab)  1 gm PO BID [facility]


   Stop: 09/08/18 08:59


   Last Admin: 07/14/18 17:48 Dose:  1 gm


Zolpidem Tartrate (Ambien)  5 mg PO HS PRN


   PRN Reason: Insomnia


   Stop: 09/08/18 02:13


   Last Admin: 07/13/18 21:46 Dose:  5 mg








General: alert


HEENT: NC/AT, PERRLA, EOMI, anicteric sclerae, throat clear


Neck: Supple, No JVD, No thyromegaly, +2 carotid pulse wo bruit


Cardiovascular: RRR, Normal S1, Normal S2, without murmur


Abdomen: soft, non-tender, non-distended


Extremities: clear


Neurological: no change





Internal Medicine Assmt/Plan





- Assessment


Assessment: 





1.ANEMIA.


2.HYPONATREMIA.


3.CONSTIPATION.


4.PSYCHOSIS





- Plan


Plan: 





CONTINUE ON CURRENT MEDICATION AND DIET.

## 2018-07-14 NOTE — PROGRESS NOTES
DATE:  07/14/2018



SUBJECTIVE:  Chart reviewed and the patient interviewed.  Also discussed the

patient's condition with the staff and reviewed the records and labs.  The

patient is still isolated and withdrawn at times and other times pacing up and

down.  The patient is guarded and he is not talking much about himself or his

feelings.  He also is interacting minimally.  The patient also is suspicious and

he is slightly paranoid.  Otherwise, the patient is more compliant with taking

his medications with no side effects of medications.



ASSESSMENT:  He is still agitated and has unpredictable behavior.



TREATMENT PLAN:  Continue to work on his impulsivity and his being guarded. 

Also, continue adjusting psychotropic medications and followup.





DD: 07/14/2018 09:11

DT: 07/14/2018 20:18

JOB# [id number]  [id number]

## 2018-07-15 NOTE — INTERNAL MEDICINE PROG NOTE
Internal Medicine Subjective





- Subjective


Service Date: 07/15/18


Patient seen and examined:: without staff


Patient is:: awake, verbal, confused


Per staff patient has:: no adverse event





Internal Medicine Objective





- Results


Result Diagrams: 


 07/09/18 23:55





 07/09/18 23:55


Recent Labs: 


 Laboratory Last Values











WBC  6.5 Th/cmm (4.8-10.8)   07/09/18  23:55    


 


RBC  3.80 Mil/cmm (4.30-5.70)  L  07/09/18  23:55    


 


Hgb  11.4 gm/dL (12-16)  L  07/09/18  23:55    


 


Hct  34.0 % (41.0-60)  L  07/09/18  23:55    


 


MCV  89.4 fl (80-99)   07/09/18  23:55    


 


MCH  29.9 pg (26.0-30.0)   07/09/18  23:55    


 


MCHC Differential  33.5 pg (28.0-36.0)   07/09/18  23:55    


 


RDW  14.7 % (11.5-20.0)   07/09/18  23:55    


 


Plt Count  371 Th/cmm (150-400)   07/09/18  23:55    


 


MPV  6.0 fl  07/09/18  23:55    


 


Neutrophils %  43.4 % (40.0-80.0)   07/09/18  23:55    


 


Lymphocytes %  42.9 % (20.0-50.0)   07/09/18  23:55    


 


Monocytes %  10.8 % (2.0-10.0)  H  07/09/18  23:55    


 


Eosinophils %  1.9 % (0.0-5.0)   07/09/18  23:55    


 


Basophils %  1.0 % (0.0-2.0)   07/09/18  23:55    


 


Sodium  132 mEq/L (136-145)  L  07/09/18  23:55    


 


Potassium  3.5 mEq/L (3.5-5.1)   07/09/18  23:55    


 


Chloride  100 mEq/L ()   07/09/18  23:55    


 


Carbon Dioxide  26.6 mEq/L (21.0-31.0)   07/09/18  23:55    


 


Anion Gap  8.9  (7.0-16.0)   07/09/18  23:55    


 


BUN  11 mg/dL (7-25)   07/09/18  23:55    


 


Creatinine  0.8 mg/dL (0.7-1.3)   07/09/18  23:55    


 


Est GFR ( Amer)  > 60.0 ml/min (>90)   07/09/18  23:55    


 


Est GFR (Non-Af Amer)  > 60.0 ml/min  07/09/18  23:55    


 


BUN/Creatinine Ratio  13.8   07/09/18  23:55    


 


Glucose  94 mg/dL ()   07/09/18  23:55    


 


Calcium  9.2 mg/dL (8.6-10.3)   07/09/18  23:55    


 


Total Bilirubin  0.2 mg/dL (0.3-1.0)  L  07/09/18  23:55    


 


AST  14 U/L (13-39)   07/09/18  23:55    


 


ALT  11 U/L (7-52)   07/09/18  23:55    


 


Alkaline Phosphatase  72 U/L ()   07/09/18  23:55    


 


Total Protein  6.2 gm/dL (6.0-8.3)   07/09/18  23:55    


 


Albumin  3.8 gm/dL (4.2-5.5)  L  07/09/18  23:55    


 


Globulin  2.4 gm/dL  07/09/18  23:55    


 


Albumin/Globulin Ratio  1.6  (1.0-1.8)   07/09/18  23:55    


 


Urine Source  CLEAN C   07/09/18  23:45    


 


Urine Color  OTHER   07/09/18  23:45    


 


Urine Clarity  CLEAR  (CLEAR)   07/09/18  23:45    


 


Urine pH  7.0  (4.6 - 8.0)   07/09/18  23:45    


 


Ur Specific Gravity  <= 1.005  (1.005-1.030)   07/09/18  23:45    


 


Urine Protein  NEGATIVE mg/dL (NEGATIVE)   07/09/18  23:45    


 


Urine Glucose (UA)  NEGATIVE mg/dL (NEGATIVE)   07/09/18  23:45    


 


Urine Ketones  NEGATIVE mg/dL (NEGATIVE)   07/09/18  23:45    


 


Urine Blood  TRACE  (NEGATIVE)   07/09/18  23:45    


 


Urine Nitrate  NEGATIVE  (NEGATIVE)   07/09/18  23:45    


 


Urine Bilirubin  NEGATIVE  (NEGATIVE)   07/09/18  23:45    


 


Urine Urobilinogen  0.2 E.U./dL (0.2 - 1.0)   07/09/18  23:45    


 


Ur Leukocyte Esterase  NEGATIVE  (NEGATIVE)   07/09/18  23:45    


 


Urine RBC  0-2 /hpf (0-5)  H  07/09/18  23:45    


 


Urine WBC  0-2 /hpf (0-5)   07/09/18  23:45    


 


Ur Epithelial Cells  OCCASIONAL /lpf (FEW)   07/09/18  23:45    


 


Urine Bacteria  OCCASIONAL /hpf (NONE SEEN)   07/09/18  23:45    














- Physical Exam


Vitals and I&O: 


 Vital Signs











Temp  97.6 F   07/15/18 14:00


 


Pulse  79   07/15/18 14:00


 


Resp  18   07/15/18 14:00


 


BP  90/59   07/15/18 14:00


 


Pulse Ox  98   07/15/18 14:00








 Intake & Output











 07/14/18 07/15/18 07/15/18





 18:59 06:59 18:59


 


Intake Total 1400 480 


 


Balance 1400 480 


 


Intake:   


 


  Oral 1400 480 


 


Other:   


 


  # Voids 4 2 











Active Medications: 


Current Medications





Acetaminophen (Tylenol)  650 mg PO Q4HR PRN


   PRN Reason: Mild Pain / Temp above 100


   Stop: 09/08/18 02:13


Al Hydrox/Mg Hydrox/Simethicone (Maalox)  30 ml PO Q4HR PRN


   PRN Reason: GI DISTRESS


   Stop: 09/08/18 02:13


Fluphenazine HCl (Prolixin)  5 mg PO Q12HR [facility]


   Stop: 09/08/18 08:59


   Last Admin: 07/15/18 08:50 Dose:  5 mg


Galantamine Hydrobromide (Razadyne)  8 mg PO Q12HR [facility]


   Stop: 09/08/18 08:59


   Last Admin: 07/15/18 08:54 Dose:  8 mg


Lorazepam (Ativan)  0.5 mg PO Q4HR PRN; Protocol


   PRN Reason: Anxiety/agitation


   Stop: 08/09/18 02:13


Magnesium Hydroxide (Milk Of Magnesia)  30 ml PO Q8HR PRN


   PRN Reason: Constipation


   Stop: 09/08/18 21:37


Multivitamins/Vitamin C (Theragran)  1 tab PO DAILY [facility]


   Stop: 09/08/18 08:59


   Last Admin: 07/15/18 08:50 Dose:  1 tab


Sodium Chloride (Nacl Tab)  1 gm PO BID [facility]


   Stop: 09/08/18 08:59


   Last Admin: 07/15/18 08:50 Dose:  1 gm


Zolpidem Tartrate (Ambien)  5 mg PO HS PRN


   PRN Reason: Insomnia


   Stop: 09/08/18 02:13


   Last Admin: 07/14/18 21:11 Dose:  5 mg








General: alert


HEENT: NC/AT, PERRLA, EOMI, anicteric sclerae, throat clear


Neck: Supple, No JVD, No thyromegaly, +2 carotid pulse wo bruit


Cardiovascular: RRR, Normal S1, Normal S2, without murmur


Abdomen: soft, non-tender, non-distended


Extremities: clear


Neurological: no change





Internal Medicine Assmt/Plan





- Assessment


Assessment: 





1.ANEMIA.


2.HYPONATREMIA.


3.CONSTIPATION.


4.PSYCHOSIS





- Plan


Plan: 





CONTINUE ON CURRENT MEDICATION AND DIET.CBC AND CMP IN AM

## 2018-07-16 LAB
ALBUMIN SERPL-MCNC: 4.3 GM/DL (ref 4.2–5.5)
ALBUMIN/GLOB SERPL: 1.5 {RATIO} (ref 1–1.8)
ALP SERPL-CCNC: 84 U/L (ref 34–104)
ALT SERPL-CCNC: 14 U/L (ref 7–52)
ANION GAP SERPL CALC-SCNC: 10 MMOL/L (ref 7–16)
AST SERPL-CCNC: 17 U/L (ref 13–39)
BASOPHILS # BLD AUTO: 0.1 TH/CUMM (ref 0–0.2)
BASOPHILS NFR BLD AUTO: 1.7 % (ref 0–2)
BILIRUB SERPL-MCNC: 0.4 MG/DL (ref 0.3–1)
BUN SERPL-MCNC: 14 MG/DL (ref 7–25)
CALCIUM SERPL-MCNC: 9.5 MG/DL (ref 8.6–10.3)
CHLORIDE SERPL-SCNC: 97 MEQ/L (ref 98–107)
CO2 SERPL-SCNC: 28.7 MEQ/L (ref 21–31)
CREAT SERPL-MCNC: 0.8 MG/DL (ref 0.7–1.3)
EOSINOPHIL # BLD AUTO: 0.1 TH/CMM (ref 0.1–0.4)
EOSINOPHIL NFR BLD AUTO: 1.2 % (ref 0–5)
ERYTHROCYTE [DISTWIDTH] IN BLOOD BY AUTOMATED COUNT: 14.6 % (ref 11.5–20)
GLOBULIN SER-MCNC: 2.8 GM/DL
GLUCOSE SERPL-MCNC: 136 MG/DL (ref 70–105)
HCT VFR BLD CALC: 39 % (ref 41–60)
HGB BLD-MCNC: 13.2 GM/DL (ref 12–16)
LYMPHOCYTE AB SER FC-ACNC: 2 TH/CMM (ref 1.5–3)
LYMPHOCYTES NFR BLD AUTO: 29.1 % (ref 20–50)
MCH RBC QN AUTO: 30.2 PG (ref 26–30)
MCHC RBC AUTO-ENTMCNC: 33.9 PG (ref 28–36)
MCV RBC AUTO: 89 FL (ref 80–99)
MONOCYTES # BLD AUTO: 0.5 TH/CMM (ref 0.3–1)
MONOCYTES NFR BLD AUTO: 6.5 % (ref 2–10)
NEUTROPHILS # BLD: 4.3 TH/CMM (ref 1.8–8)
NEUTROPHILS NFR BLD AUTO: 61.5 % (ref 40–80)
PLATELET # BLD: 370 TH/CMM (ref 150–400)
PMV BLD AUTO: 6.3 FL
POTASSIUM SERPL-SCNC: 3.7 MEQ/L (ref 3.5–5.1)
RBC # BLD AUTO: 4.38 MIL/CMM (ref 4.3–5.7)
SODIUM SERPL-SCNC: 132 MEQ/L (ref 136–145)
WBC # BLD AUTO: 7 TH/CMM (ref 4.8–10.8)

## 2018-07-16 NOTE — INTERNAL MEDICINE PROG NOTE
Internal Medicine Subjective





- Subjective


Service Date: 18


Patient seen and examined:: with staff


Patient is:: awake, verbal, confused


Per staff patient has:: no adverse event





Internal Medicine Objective





- Results


Result Diagrams: 


 18 08:15





 18 08:15


Recent Labs: 


 Laboratory Last Values











WBC  7.0 Th/cmm (4.8-10.8)   18  08:15    


 


RBC  4.38 Mil/cmm (4.30-5.70)   18  08:15    


 


Hgb  13.2 gm/dL (12-16)   18  08:15    


 


Hct  39.0 % (41.0-60)  L  18  08:15    


 


MCV  89.0 fl (80-99)   18  08:15    


 


MCH  30.2 pg (26.0-30.0)  H  18  08:15    


 


MCHC Differential  33.9 pg (28.0-36.0)   18  08:15    


 


RDW  14.6 % (11.5-20.0)   18  08:15    


 


Plt Count  370 Th/cmm (150-400)   18  08:15    


 


MPV  6.3 fl  18  08:15    


 


Neutrophils %  61.5 % (40.0-80.0)   18  08:15    


 


Lymphocytes %  29.1 % (20.0-50.0)   18  08:15    


 


Monocytes %  6.5 % (2.0-10.0)   18  08:15    


 


Eosinophils %  1.2 % (0.0-5.0)   18  08:15    


 


Basophils %  1.7 % (0.0-2.0)   18  08:15    


 


Sodium  132 mEq/L (136-145)  L  18  08:15    


 


Potassium  3.7 mEq/L (3.5-5.1)   18  08:15    


 


Chloride  97 mEq/L ()  L  18  08:15    


 


Carbon Dioxide  28.7 mEq/L (21.0-31.0)   18  08:15    


 


Anion Gap  10.0  (7.0-16.0)   18  08:15    


 


BUN  14 mg/dL (7-25)   18  08:15    


 


Creatinine  0.8 mg/dL (0.7-1.3)   18  08:15    


 


Est GFR ( Amer)  > 60.0 ml/min (>90)   18  08:15    


 


Est GFR (Non-Af Amer)  > 60.0 ml/min  18  08:15    


 


BUN/Creatinine Ratio  17.5   18  08:15    


 


Glucose  136 mg/dL ()  H  18  08:15    


 


Calcium  9.5 mg/dL (8.6-10.3)   18  08:15    


 


Total Bilirubin  0.4 mg/dL (0.3-1.0)   18  08:15    


 


AST  17 U/L (13-39)   18  08:15    


 


ALT  14 U/L (7-52)   18  08:15    


 


Alkaline Phosphatase  84 U/L ()   18  08:15    


 


Total Protein  7.1 gm/dL (6.0-8.3)   18  08:15    


 


Albumin  4.3 gm/dL (4.2-5.5)   18  08:15    


 


Globulin  2.8 gm/dL  18  08:15    


 


Albumin/Globulin Ratio  1.5  (1.0-1.8)   18  08:15    


 


Urine Source  CLEAN C   18  23:45    


 


Urine Color  OTHER   18  23:45    


 


Urine Clarity  CLEAR  (CLEAR)   18  23:45    


 


Urine pH  7.0  (4.6 - 8.0)   18  23:45    


 


Ur Specific Gravity  <= 1.005  (1.005-1.030)   18  23:45    


 


Urine Protein  NEGATIVE mg/dL (NEGATIVE)   18  23:45    


 


Urine Glucose (UA)  NEGATIVE mg/dL (NEGATIVE)   18  23:45    


 


Urine Ketones  NEGATIVE mg/dL (NEGATIVE)   18  23:45    


 


Urine Blood  TRACE  (NEGATIVE)   18  23:45    


 


Urine Nitrate  NEGATIVE  (NEGATIVE)   18  23:45    


 


Urine Bilirubin  NEGATIVE  (NEGATIVE)   18  23:45    


 


Urine Urobilinogen  0.2 E.U./dL (0.2 - 1.0)   18  23:45    


 


Ur Leukocyte Esterase  NEGATIVE  (NEGATIVE)   18  23:45    


 


Urine RBC  0-2 /hpf (0-5)  H  18  23:45    


 


Urine WBC  0-2 /hpf (0-5)   18  23:45    


 


Ur Epithelial Cells  OCCASIONAL /lpf (FEW)   18  23:45    


 


Urine Bacteria  OCCASIONAL /hpf (NONE SEEN)   18  23:45    














- Physical Exam


Vitals and I&O: 


 Vital Signs











Temp  98.2 F   18 20:00


 


Pulse  63   18 20:00


 


Resp  16   18 20:00


 


BP  99/65   18 20:00


 


Pulse Ox  96   18 20:00








 Intake & Output











 18





 06:59 18:59 06:59


 


Intake Total 240  


 


Balance 240  


 


Intake:   


 


  Oral 240  


 


Other:   


 


  # Voids 2  











Active Medications: 


Current Medications





Acetaminophen (Tylenol)  650 mg PO Q4HR PRN


   PRN Reason: Mild Pain / Temp above 100


   Stop: 18 02:13


Al Hydrox/Mg Hydrox/Simethicone (Maalox)  30 ml PO Q4HR PRN


   PRN Reason: GI DISTRESS


   Stop: 18 02:13


Fluphenazine HCl (Prolixin)  10 mg PO Q12HR [facility]


   Stop: 18 08:59


   Last Admin: 18 09:17 Dose:  10 mg


Galantamine Hydrobromide (Razadyne)  8 mg PO Q12HR [facility]


   Stop: 18 08:59


   Last Admin: 18 09:17 Dose:  8 mg


Lorazepam (Ativan)  0.5 mg PO Q4HR PRN; Protocol


   PRN Reason: Anxiety/agitation


   Stop: 18 02:13


   Last Admin: 18 13:40 Dose:  0.5 mg


Magnesium Hydroxide (Milk Of Magnesia)  30 ml PO Q8HR PRN


   PRN Reason: Constipation


   Stop: 18 21:37


Multivitamins/Vitamin C (Theragran)  1 tab PO DAILY [facility]ICA


   Stop: 18 08:59


   Last Admin: 18 09:17 Dose:  1 tab


Sodium Chloride (Nacl Tab)  1 gm PO BID MONICA


   Stop: 18 08:59


   Last Admin: 18 17:11 Dose:  1 gm


Zolpidem Tartrate (Ambien)  5 mg PO HS PRN


   PRN Reason: Insomnia


   Stop: 18 02:13


   Last Admin: 07/15/18 21:34 Dose:  5 mg








General: alert


HEENT: NC/AT, PERRLA, EOMI, anicteric sclerae, throat clear


Neck: Supple, No JVD, No thyromegaly, +2 carotid pulse wo bruit


Cardiovascular: RRR, Normal S1, Normal S2, without murmur


Abdomen: soft, non-tender, non-distended


Extremities: clear


Neurological: no change





Internal Medicine Assmt/Plan





- Assessment


Assessment: 





1.ANEMIA.


2.HYPONATREMIA.


3.CONSTIPATION.


4.PSYCHOSIS





- Plan


Plan: 





CONTINUE ON CURRENT MEDICATION AND DIET.





Nutritional Asmnt/Malnutr-PDOC





- Dietary Evaluation


Malnutrition Findings (Please click <Entered> for more info): 








Nutritional Asmnt/Malnutrition                             Start:  18 15:

18


Text:                                                      Status: Complete    

  


Freq:                                                                          

  


Protocol:                                                                      

  


 Document     18 15:18  LCHENG  (Rec: 18 15:32  LCHENG  [email or web address])


 Nutritional Asmnt/Malnutrition


     Patient General Information


      Nutritional Screening                      Low Risk


      Diagnosis                                  psychosis


      Pertinent Medical Hx/Surgical Hx           psychosis


      Subjective Information                     pt seen walking in hallway at


                                                 time of visit. Pt stated good


                                                 appetite. Per EMR, PO intake


                                                 100% of meals.


      Current Diet Order/ Nutrition Support      regular


      Pertinent Medications                      theragran


      Pertinent Labs                              Na 132, Cl 97, glucose


                                                 136


                                                  Na 132, glucose 94


     Nutritional Hx/Data


      Height                                     1.63 m


      Height (Calculated Centimeters)            162.6


      Current Weight (lbs)                       68.039 kg


      Weight (Calculated Kilograms)              68.0


      Weight (Calculated Grams)                  06450.9


      Ideal Body Weight                          130


      Body Mass Index (BMI)                      25.7


      Weight Status                              Overweight


     GI Symptoms


      GI Symptoms                                None


      Last BM                                    none


      Difficult in:                              None


      Skin Integrity/Comment:                    xiang score 21


      Current %PO                                Good (%)


     Estimated Nutritional Goals


      BEE in Kcals:                              Using Current wt


      Calories/Kcals/Kg                          23-27


      Kcals Calculated                           6024-0554


      Protein:                                   Using Current wt


      Protein g/k.8


      Protein Calculated                         54-68


      Fluid: ml                                  1564-1836ml (1ml/kcal)


     Nutritional Problem


      No current Nutrition Prob


       Problem                                   N/A


     Malnutrition Alert


      Is there a minimum of two criteria         No


       selected?                                 


       Query Text:Check all the applicable       


       criteria. A minimum of two criteria are   


       recommended for diagnosis of either       


       severe or non-severe malnutrition.        


     Malnutrition Related to Morbid Obesity


      Malnutrition related to morbid obesity     No


     Intervention/Recommendation


      Comments                                   1. Continue with regular diet


                                                 as ordered. Monitor glucose


                                                 level.


                                                 2. Monitor PO intake, wt, labs


                                                 and skin integrity


                                                 3. F/U as low risk in 7 days,


                                                 


     Expected Outcomes/Goals


      Expected Outcomes/Goals                    1. PO intake to meet at least


                                                 75% of nutritional needs.


                                                 2. Wt stability, skin to


                                                 remain intact, labs to


                                                 approach WNL.

## 2018-07-16 NOTE — PROGRESS NOTES
DATE:  07/16/2018



SUBJECTIVE:  Chart reviewed and the patient interviewed.  Also, discussed the

patient's condition with the staff and reviewed records and labs.  The patient

is still agitated and is still intrusive. The patient have multiple questions

and repeating the same questions over and over and intrusive to others.  The

patient also is still suspicious and paranoid and have difficulty following

directions.  Otherwise, the patient is compliant with taking his medications

with no side effects of medications.



ASSESSMENT:  The patient is still psychotic and agitated.



TREATMENT PLAN:  Continue to monitor behavior.  Also, we will increase Prolixin

to 10 mg twice a day and we will continue to follow up.





DD: 07/16/2018 21:24

DT: 07/16/2018 23:23

JOB# [id number]  [id number]

## 2018-07-16 NOTE — PROGRESS NOTES
DATE:  07/15/2018



Chart reviewed and the patient interviewed.  Also discussed the patient's

condition with the staff and reviewed records and labs.  The patient is still

pacing up and down the unit.  The patient also is still restless and is still in

irritable mood and he wants to be left alone.  The patient also is depressed and

anxious and angry.  He also had difficulty expressing himself and expressing his

feelings.  Otherwise, the patient is compliant with taking medications with no

side effects of medications.



ASSESSMENT:  The patient is still irritable and unpredictable behavior.



TREATMENT PLAN:  Continue to monitor his behavior and continue to follow up.





DD: 07/15/2018 13:29

DT: 07/16/2018 02:52

JOB# [id number]  [id number]

## 2018-07-17 NOTE — INTERNAL MEDICINE PROG NOTE
Internal Medicine Subjective





- Subjective


Service Date: 18


Patient seen and examined:: with staff


Patient is:: awake, verbal, confused


Per staff patient has:: no adverse event





Internal Medicine Objective





- Results


Result Diagrams: 


 18 08:15





 18 08:15


Recent Labs: 


 Laboratory Last Values











WBC  7.0 Th/cmm (4.8-10.8)   18  08:15    


 


RBC  4.38 Mil/cmm (4.30-5.70)   18  08:15    


 


Hgb  13.2 gm/dL (12-16)   18  08:15    


 


Hct  39.0 % (41.0-60)  L  18  08:15    


 


MCV  89.0 fl (80-99)   18  08:15    


 


MCH  30.2 pg (26.0-30.0)  H  18  08:15    


 


MCHC Differential  33.9 pg (28.0-36.0)   18  08:15    


 


RDW  14.6 % (11.5-20.0)   18  08:15    


 


Plt Count  370 Th/cmm (150-400)   18  08:15    


 


MPV  6.3 fl  18  08:15    


 


Neutrophils %  61.5 % (40.0-80.0)   18  08:15    


 


Lymphocytes %  29.1 % (20.0-50.0)   18  08:15    


 


Monocytes %  6.5 % (2.0-10.0)   18  08:15    


 


Eosinophils %  1.2 % (0.0-5.0)   18  08:15    


 


Basophils %  1.7 % (0.0-2.0)   18  08:15    


 


Sodium  132 mEq/L (136-145)  L  18  08:15    


 


Potassium  3.7 mEq/L (3.5-5.1)   18  08:15    


 


Chloride  97 mEq/L ()  L  18  08:15    


 


Carbon Dioxide  28.7 mEq/L (21.0-31.0)   18  08:15    


 


Anion Gap  10.0  (7.0-16.0)   18  08:15    


 


BUN  14 mg/dL (7-25)   18  08:15    


 


Creatinine  0.8 mg/dL (0.7-1.3)   18  08:15    


 


Est GFR ( Amer)  > 60.0 ml/min (>90)   18  08:15    


 


Est GFR (Non-Af Amer)  > 60.0 ml/min  18  08:15    


 


BUN/Creatinine Ratio  17.5   18  08:15    


 


Glucose  136 mg/dL ()  H  18  08:15    


 


Calcium  9.5 mg/dL (8.6-10.3)   18  08:15    


 


Total Bilirubin  0.4 mg/dL (0.3-1.0)   18  08:15    


 


AST  17 U/L (13-39)   18  08:15    


 


ALT  14 U/L (7-52)   18  08:15    


 


Alkaline Phosphatase  84 U/L ()   18  08:15    


 


Total Protein  7.1 gm/dL (6.0-8.3)   18  08:15    


 


Albumin  4.3 gm/dL (4.2-5.5)   18  08:15    


 


Globulin  2.8 gm/dL  18  08:15    


 


Albumin/Globulin Ratio  1.5  (1.0-1.8)   18  08:15    


 


Urine Source  CLEAN C   18  23:45    


 


Urine Color  OTHER   18  23:45    


 


Urine Clarity  CLEAR  (CLEAR)   18  23:45    


 


Urine pH  7.0  (4.6 - 8.0)   18  23:45    


 


Ur Specific Gravity  <= 1.005  (1.005-1.030)   18  23:45    


 


Urine Protein  NEGATIVE mg/dL (NEGATIVE)   18  23:45    


 


Urine Glucose (UA)  NEGATIVE mg/dL (NEGATIVE)   18  23:45    


 


Urine Ketones  NEGATIVE mg/dL (NEGATIVE)   18  23:45    


 


Urine Blood  TRACE  (NEGATIVE)   18  23:45    


 


Urine Nitrate  NEGATIVE  (NEGATIVE)   18  23:45    


 


Urine Bilirubin  NEGATIVE  (NEGATIVE)   18  23:45    


 


Urine Urobilinogen  0.2 E.U./dL (0.2 - 1.0)   18  23:45    


 


Ur Leukocyte Esterase  NEGATIVE  (NEGATIVE)   18  23:45    


 


Urine RBC  0-2 /hpf (0-5)  H  18  23:45    


 


Urine WBC  0-2 /hpf (0-5)   18  23:45    


 


Ur Epithelial Cells  OCCASIONAL /lpf (FEW)   18  23:45    


 


Urine Bacteria  OCCASIONAL /hpf (NONE SEEN)   18  23:45    














- Physical Exam


Vitals and I&O: 


 Vital Signs











Temp  97.7 F   18 16:20


 


Pulse  84   18 16:20


 


Resp  20   18 16:20


 


BP  128/68   18 16:20


 


Pulse Ox  95   18 16:20











Active Medications: 


Current Medications





Acetaminophen (Tylenol)  650 mg PO Q4HR PRN


   PRN Reason: Mild Pain / Temp above 100


   Stop: 18 02:13


Al Hydrox/Mg Hydrox/Simethicone (Maalox)  30 ml PO Q4HR PRN


   PRN Reason: GI DISTRESS


   Stop: 18 02:13


Fluphenazine HCl (Prolixin)  10 mg PO Q12HR [facility]


   Stop: 18 08:59


   Last Admin: 18 21:03 Dose:  10 mg


Galantamine Hydrobromide (Razadyne)  8 mg PO Q12HR [facility]


   Stop: 18 08:59


   Last Admin: 18 21:03 Dose:  8 mg


Lorazepam (Ativan)  0.5 mg PO Q4HR PRN; Protocol


   PRN Reason: Anxiety/agitation


   Stop: 18 02:13


   Last Admin: 18 16:14 Dose:  0.5 mg


Magnesium Hydroxide (Milk Of Magnesia)  30 ml PO Q8HR PRN


   PRN Reason: Constipation


   Stop: 18 21:37


Multivitamins/Vitamin C (Theragran)  1 tab PO DAILY [facility]


   Stop: 18 08:59


   Last Admin: 18 09:20 Dose:  1 tab


Sodium Chloride (Nacl Tab)  1 gm PO BID [facility]


   Stop: 18 08:59


   Last Admin: 18 16:14 Dose:  1 gm


Zolpidem Tartrate (Ambien)  5 mg PO HS PRN


   PRN Reason: Insomnia


   Stop: 18 02:13


   Last Admin: 07/15/18 21:34 Dose:  5 mg








General: alert


HEENT: NC/AT, PERRLA, EOMI, anicteric sclerae, throat clear


Neck: Supple, No JVD, No thyromegaly, +2 carotid pulse wo bruit


Cardiovascular: RRR, Normal S1, Normal S2, without murmur


Abdomen: soft, non-tender, non-distended


Extremities: clear


Neurological: no change





Internal Medicine Assmt/Plan





- Assessment


Assessment: 





1.ANEMIA.


2.HYPONATREMIA.


3.CONSTIPATION.


4.PSYCHOSIS





- Plan


Plan: 





CONTINUE ON CURRENT MEDICATION AND DIET.





Nutritional Asmnt/Malnutr-PDOC





- Dietary Evaluation


Malnutrition Findings (Please click <Entered> for more info): 








Nutritional Asmnt/Malnutrition                             Start:  18 15:

18


Text:                                                      Status: Complete    

  


Freq:                                                                          

  


Protocol:                                                                      

  


 Document     18 15:18  LCHENG  (Rec: 18 15:32  LCHENG  [email or web address])


 Nutritional Asmnt/Malnutrition


     Patient General Information


      Nutritional Screening                      Low Risk


      Diagnosis                                  psychosis


      Pertinent Medical Hx/Surgical Hx           psychosis


      Subjective Information                     pt seen walking in hallway at


                                                 time of visit. Pt stated good


                                                 appetite. Per EMR, PO intake


                                                 100% of meals.


      Current Diet Order/ Nutrition Support      regular


      Pertinent Medications                      theragran


      Pertinent Labs                              Na 132, Cl 97, glucose


                                                 136


                                                 7/9 Na 132, glucose 94


     Nutritional Hx/Data


      Height                                     1.63 m


      Height (Calculated Centimeters)            162.6


      Current Weight (lbs)                       68.039 kg


      Weight (Calculated Kilograms)              68.0


      Weight (Calculated Grams)                  86915.9


      Ideal Body Weight                          130


      Body Mass Index (BMI)                      25.7


      Weight Status                              Overweight


     GI Symptoms


      GI Symptoms                                None


      Last BM                                    none


      Difficult in:                              None


      Skin Integrity/Comment:                    xiang score 21


      Current %PO                                Good (%)


     Estimated Nutritional Goals


      BEE in Kcals:                              Using Current wt


      Calories/Kcals/Kg                          23-27


      Kcals Calculated                           5358-7360


      Protein:                                   Using Current wt


      Protein g/k.8


      Protein Calculated                         54-68


      Fluid: ml                                  1564-1836ml (1ml/kcal)


     Nutritional Problem


      No current Nutrition Prob


       Problem                                   N/A


     Malnutrition Alert


      Is there a minimum of two criteria         No


       selected?                                 


       Query Text:Check all the applicable       


       criteria. A minimum of two criteria are   


       recommended for diagnosis of either       


       severe or non-severe malnutrition.        


     Malnutrition Related to Morbid Obesity


      Malnutrition related to morbid obesity     No


     Intervention/Recommendation


      Comments                                   1. Continue with regular diet


                                                 as ordered. Monitor glucose


                                                 level.


                                                 2. Monitor PO intake, wt, labs


                                                 and skin integrity


                                                 3. F/U as low risk in 7 days,


                                                 


     Expected Outcomes/Goals


      Expected Outcomes/Goals                    1. PO intake to meet at least


                                                 75% of nutritional needs.


                                                 2. Wt stability, skin to


                                                 remain intact, labs to


                                                 approach WNL.

## 2018-07-17 NOTE — DISCHARGE SUMMARY
DATE OF DISCHARGE:  07/17/2018



AGE:  58



SEX:  Male.



1PHYSICIAN:  Dr. Greer.



FINAL DIAGNOSIS AND PRIMARY DIAGNOSIS:  Schizoaffective disorder, _____, with

psychotic features.



REASON FOR HOSPITALIZATION:  The patient was admitted to the hospital because of

increased agitation and irritability and because of mood swings.



HOSPITAL COURSE:  The patient continued to be extremely agitated and in

irritable moods.  The patient also was angry.  The patient was restarted on

psychotropic medications.  Gradually, the patient's affect was brighter.  The

patient was less agitated and less irritable.  Also, was cooperative and

compliant with taking his medications.  The patient was accepted back to [facility] and the patient was discharged there.



Physical exam of the patient was basically with no major medical problems.



AFTER DISCHARGE PLANS:  The patient discharged from the hospital and went to

live in [facility] with plans for followup there.



EXPECTED OUTCOME AFTER DISCHARGE:  Fair if the patient continued to follow up

with his psychotropic medications and with discharge plans.





DD: 07/17/2018 16:51

DT: 07/17/2018 20:02

JOB# [id number]  [id number]

## 2018-07-18 NOTE — PROGRESS NOTES
DATE:  07/17/2018



SUBJECTIVE:  Chart reviewed and the patient interviewed.  Also discussed the

patient's condition with the staff and reviewed records and labs.  The patient

is still anxious.  There is also report of irritability and agitation.  Also,

still needs lots of redirections.  Otherwise, the patient is compliant with

taking his medications with no side effect of medications.



ASSESSMENT:  The patient is psychotic, but is improving.



TREATMENT PLAN:  Continue to monitor his behavior.  Also, continue to work on

his discharge plans.  Plan is to discharge the patient today if the patient

accepted with placement, otherwise will be discharged tomorrow.





DD: 07/17/2018 17:16

DT: 07/18/2018 01:38

JOB# [id number]  [id number]